# Patient Record
Sex: FEMALE
[De-identification: names, ages, dates, MRNs, and addresses within clinical notes are randomized per-mention and may not be internally consistent; named-entity substitution may affect disease eponyms.]

---
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
## 2023-04-04 ENCOUNTER — APPOINTMENT (OUTPATIENT)
Dept: HEMOPHILIA TREATMENT | Facility: HOSPITAL | Age: 23
End: 2023-04-04

## 2023-05-23 ENCOUNTER — APPOINTMENT (OUTPATIENT)
Dept: HEMOPHILIA TREATMENT | Facility: HOSPITAL | Age: 23
End: 2023-05-23

## 2023-05-23 ENCOUNTER — RESULT REVIEW (OUTPATIENT)
Age: 23
End: 2023-05-23

## 2023-05-23 ENCOUNTER — OUTPATIENT (OUTPATIENT)
Dept: OUTPATIENT SERVICES | Age: 23
LOS: 1 days | End: 2023-05-23

## 2023-05-23 VITALS
RESPIRATION RATE: 16 BRPM | WEIGHT: 126.77 LBS | HEART RATE: 81 BPM | HEIGHT: 63.39 IN | SYSTOLIC BLOOD PRESSURE: 114 MMHG | DIASTOLIC BLOOD PRESSURE: 65 MMHG | TEMPERATURE: 98.6 F | BODY MASS INDEX: 22.18 KG/M2

## 2023-05-23 DIAGNOSIS — D50.9 IRON DEFICIENCY ANEMIA, UNSPECIFIED: ICD-10-CM

## 2023-05-23 DIAGNOSIS — N92.0 EXCESSIVE AND FREQUENT MENSTRUATION WITH REGULAR CYCLE: ICD-10-CM

## 2023-05-23 DIAGNOSIS — D66 HEREDITARY FACTOR VIII DEFICIENCY: ICD-10-CM

## 2023-05-23 DIAGNOSIS — Z79.3 LONG TERM (CURRENT) USE OF HORMONAL CONTRACEPTIVES: ICD-10-CM

## 2023-05-23 DIAGNOSIS — R79.1 ABNORMAL COAGULATION PROFILE: ICD-10-CM

## 2023-05-23 DIAGNOSIS — D68.00 VON WILLEBRAND DISEASE, UNSPECIFIED: ICD-10-CM

## 2023-05-23 DIAGNOSIS — Z00.00 ENCOUNTER FOR GENERAL ADULT MEDICAL EXAMINATION W/OUT ABNORMAL FINDINGS: ICD-10-CM

## 2023-05-23 DIAGNOSIS — D68.0 VON WILLEBRAND'S DISEASE: ICD-10-CM

## 2023-05-23 DIAGNOSIS — D68.9 COAGULATION DEFECT, UNSPECIFIED: ICD-10-CM

## 2023-05-23 LAB — 24R-OH-CALCIDIOL SERPL-MCNC: 37.6 NG/ML — SIGNIFICANT CHANGE UP (ref 30–80)

## 2023-05-23 RX ORDER — CHLORHEXIDINE GLUCONATE 4 %
325 (65 FE) LIQUID (ML) TOPICAL
Refills: 3 | Status: DISCONTINUED | COMMUNITY
End: 2023-05-23

## 2023-05-23 NOTE — REASON FOR VISIT
[CPE Visit] : a comprehensive physical exam. [Von Williebrand's Disease] : von williebrand's disease

## 2023-05-24 LAB
ALBUMIN SERPL ELPH-MCNC: 4.5 G/DL
ALP BLD-CCNC: 89 U/L
ALT SERPL-CCNC: 20 U/L
ANION GAP SERPL CALC-SCNC: 17 MMOL/L
AST SERPL-CCNC: 19 U/L
BILIRUB SERPL-MCNC: 0.2 MG/DL
BUN SERPL-MCNC: 8 MG/DL
CALCIUM SERPL-MCNC: 9.5 MG/DL
CHLORIDE SERPL-SCNC: 103 MMOL/L
CO2 SERPL-SCNC: 18 MMOL/L
CREAT SERPL-MCNC: 0.57 MG/DL
EGFR: 133 ML/MIN/1.73M2
FACT VIII ACT/NOR PPP: 72.8 %
FERRITIN SERPL-MCNC: 33 NG/ML
GLUCOSE SERPL-MCNC: 80 MG/DL
IRON SATN MFR SERPL: 13 %
IRON SERPL-MCNC: 50 UG/DL
POTASSIUM SERPL-SCNC: 4.2 MMOL/L
PROT SERPL-MCNC: 6.4 G/DL
RBC # BLD: 4.33 M/UL
RETICS # AUTO: 2 %
RETICS AGGREG/RBC NFR: 86.6 K/UL
SODIUM SERPL-SCNC: 138 MMOL/L
TIBC SERPL-MCNC: 384 UG/DL
UIBC SERPL-MCNC: 334 UG/DL
VWF AG PPP IA-ACNC: 52 %
VWF:RCO ACT/NOR PPP PL AGG: 44 %

## 2023-05-25 ENCOUNTER — NON-APPOINTMENT (OUTPATIENT)
Age: 23
End: 2023-05-25

## 2023-05-26 NOTE — ASSESSMENT
[FreeTextEntry1] : Echo is a 22 yr old girl with Type 1 Von Willebrand disease, Hashimoto thyroiditis and heavy menses.\par \par Her menses are better controlled on combined OC pills and she seems to be tolerating the OC pills for now. We will continue the same. We discussed adding TXA as an adjunct if her menses get heavier or if she develops iron deficiency. She has no other bleeding symptoms at the time and no surgeries or procedures planned.\par \par Today was her first visit by herself. We spent time on VWD education -\par \par Von Willebrand factor is an important protein playing a crucial role in hemostasis by binding to both platelets and endothelial cells. Additionally, it also acts a s a carrier for Factor VIII. It is the most common inherited bleeding disorder affecting 0.1% of the population. \par \par Type 1 VWD is an autosomal dominant bleeding disorder. It is the most common subtype of VWD and accounts for around 70-75% of patients. \par \par Individuals with VWD are at increased risk of bleeding. They can have increased bleeding after surgeries and other minor procedures, after trauma and the can have other bleeding manifestations outside of these situations.\par \par I counselled Kalin about the VWD, the several subtypes, the increased risk of bleeding, the need for a treatment plan around surgeries and the treatment options. She will need a DDAVP trial prior to a major surgery\par \par Return for another Comp visit in 2 years\par \par \par

## 2023-05-26 NOTE — HISTORY OF PRESENT ILLNESS
[de-identified] : Echo is a 22 yr old girl with Type 1 VWD, heavy menses, Hashimoto thyroiditis and anxiety disorder\par \par She was diagnosed in 2018 with heavy menses/PCOS and VWD. She had the initial consult at the time. Her periods got better controlled once she was started on combined OC pills by her Endocrinologist for the thyroid disorder. She continues on the same with Levothyroxine\par \par PAST HISTORY: No other bleeding, appendectomy at age 5 with no bleeding\par FAMILY HISTORY: Not significant\par STUDIES: [facility] dataset and CDC consented May 2023

## 2023-06-01 ENCOUNTER — TRANSCRIPTION ENCOUNTER (OUTPATIENT)
Age: 23
End: 2023-06-01

## 2025-05-20 ENCOUNTER — APPOINTMENT (OUTPATIENT)
Dept: HEMOPHILIA TREATMENT | Facility: HOSPITAL | Age: 25
End: 2025-05-20